# Patient Record
Sex: MALE | Employment: STUDENT | URBAN - METROPOLITAN AREA
[De-identification: names, ages, dates, MRNs, and addresses within clinical notes are randomized per-mention and may not be internally consistent; named-entity substitution may affect disease eponyms.]

---

## 2024-11-08 ENCOUNTER — OFFICE VISIT (OUTPATIENT)
Age: 19
End: 2024-11-08

## 2024-11-08 VITALS
TEMPERATURE: 97.2 F | BODY MASS INDEX: 21.87 KG/M2 | OXYGEN SATURATION: 97 % | HEIGHT: 73 IN | SYSTOLIC BLOOD PRESSURE: 141 MMHG | DIASTOLIC BLOOD PRESSURE: 89 MMHG | WEIGHT: 165 LBS | HEART RATE: 81 BPM | RESPIRATION RATE: 16 BRPM

## 2024-11-08 DIAGNOSIS — Z02.89 ENCOUNTER TO OBTAIN EXCUSE FROM SCHOOL: Primary | ICD-10-CM

## 2024-11-08 DIAGNOSIS — J06.9 VIRAL UPPER RESPIRATORY TRACT INFECTION: ICD-10-CM

## 2024-11-08 PROBLEM — J34.3 HYPERTROPHY OF NASAL TURBINATES: Status: ACTIVE | Noted: 2024-11-08

## 2024-11-08 ASSESSMENT — ENCOUNTER SYMPTOMS
SORE THROAT: 1
VOICE CHANGE: 0
SHORTNESS OF BREATH: 0
VOMITING: 0
COUGH: 1
NAUSEA: 0
ABDOMINAL PAIN: 0
DIARRHEA: 0
TROUBLE SWALLOWING: 0
RHINORRHEA: 1

## 2024-11-08 NOTE — PROGRESS NOTES
is normal.      Breath sounds: Normal breath sounds.   Neurological:      Mental Status: He is alert and oriented to person, place, and time.   Psychiatric:         Mood and Affect: Mood normal.         Behavior: Behavior normal.          No results found for this visit on 11/08/24.       ASSESSMENT and PLAN         Diagnosis Orders   1. Encounter to obtain excuse from school        2. Viral upper respiratory tract infection              Recommendations: Did not test for flu or covid due to low community wastewater/circulation. Discussed viral vs bacterial upper respiratory infections and the treatment for both. Explained antibiotics do not work on viral conditions and do not prevent viruses from progressing to a bacterial condition. Suggested OTC treatments to help alleviate and resolve symptoms; these OTC medicines may help prevent a progression to a bacterial condition by resolving symptoms. Discussed importance of adequate oral hydration. Rest. Good hand hygiene. Use CDC precautions while sick, including wearing a mask to prevent or decrease transmission of symptoms.           Follow up: Patient was encouraged to return to the clinic and/or PCP if s/s persist or do not resolve completely. Also advised to call 911/go to ER if new or worsening signs and symptoms which warrant immediate evaluation including, but not limited to: increased or persistent vomiting or diarrhea, increased or consistent headache, vision changes, speech disturbance, difficulty with ambulation/gait, numbness, tingling, weakness, fever (100.4 or higher), syncope, neck stiffness, abdominal pain, chest pain, shortness of breath, changes in mental alertness or increased/evolving symptoms for which you were seen.    *Counseling provided on benefits of having a primary care provider which includes, but is not limited to, continuity of care and having a medical home when routine and emergent health needs arise. Also reminded patient that onsite